# Patient Record
Sex: MALE | Race: WHITE | Employment: UNEMPLOYED | URBAN - METROPOLITAN AREA
[De-identification: names, ages, dates, MRNs, and addresses within clinical notes are randomized per-mention and may not be internally consistent; named-entity substitution may affect disease eponyms.]

---

## 2019-03-23 ENCOUNTER — APPOINTMENT (OUTPATIENT)
Dept: CT IMAGING | Age: 44
End: 2019-03-23
Attending: EMERGENCY MEDICINE
Payer: MEDICARE

## 2019-03-23 ENCOUNTER — HOSPITAL ENCOUNTER (EMERGENCY)
Age: 44
Discharge: HOME | End: 2019-03-24
Attending: EMERGENCY MEDICINE
Payer: MEDICARE

## 2019-03-23 DIAGNOSIS — M54.31 BILATERAL SCIATICA: Primary | ICD-10-CM

## 2019-03-23 DIAGNOSIS — M54.32 BILATERAL SCIATICA: Primary | ICD-10-CM

## 2019-03-23 LAB
ANION GAP SERPL CALC-SCNC: 6 MMOL/L (ref 3–18)
BASOPHILS # BLD: 0 K/UL (ref 0–0.1)
BASOPHILS NFR BLD: 1 % (ref 0–2)
BUN SERPL-MCNC: 12 MG/DL (ref 7–18)
BUN/CREAT SERPL: 11 (ref 12–20)
CALCIUM SERPL-MCNC: 8.5 MG/DL (ref 8.5–10.1)
CHLORIDE SERPL-SCNC: 103 MMOL/L (ref 100–108)
CO2 SERPL-SCNC: 30 MMOL/L (ref 21–32)
CREAT SERPL-MCNC: 1.06 MG/DL (ref 0.6–1.3)
DIFFERENTIAL METHOD BLD: ABNORMAL
EOSINOPHIL # BLD: 0.1 K/UL (ref 0–0.4)
EOSINOPHIL NFR BLD: 1 % (ref 0–5)
ERYTHROCYTE [DISTWIDTH] IN BLOOD BY AUTOMATED COUNT: 13.7 % (ref 11.6–14.5)
GLUCOSE SERPL-MCNC: 107 MG/DL (ref 74–99)
HCT VFR BLD AUTO: 43.2 % (ref 36–48)
HGB BLD-MCNC: 14.3 G/DL (ref 13–16)
LYMPHOCYTES # BLD: 3.3 K/UL (ref 0.9–3.6)
LYMPHOCYTES NFR BLD: 39 % (ref 21–52)
MCH RBC QN AUTO: 29.9 PG (ref 24–34)
MCHC RBC AUTO-ENTMCNC: 33.1 G/DL (ref 31–37)
MCV RBC AUTO: 90.4 FL (ref 74–97)
MONOCYTES # BLD: 0.9 K/UL (ref 0.05–1.2)
MONOCYTES NFR BLD: 11 % (ref 3–10)
NEUTS SEG # BLD: 4.1 K/UL (ref 1.8–8)
NEUTS SEG NFR BLD: 48 % (ref 40–73)
PLATELET # BLD AUTO: 323 K/UL (ref 135–420)
PMV BLD AUTO: 10.1 FL (ref 9.2–11.8)
POTASSIUM SERPL-SCNC: 4 MMOL/L (ref 3.5–5.5)
RBC # BLD AUTO: 4.78 M/UL (ref 4.7–5.5)
SODIUM SERPL-SCNC: 139 MMOL/L (ref 136–145)
WBC # BLD AUTO: 8.4 K/UL (ref 4.6–13.2)

## 2019-03-23 PROCEDURE — 94761 N-INVAS EAR/PLS OXIMETRY MLT: CPT

## 2019-03-23 PROCEDURE — 74011250636 HC RX REV CODE- 250/636: Performed by: EMERGENCY MEDICINE

## 2019-03-23 PROCEDURE — 72131 CT LUMBAR SPINE W/O DYE: CPT

## 2019-03-23 PROCEDURE — 77030005514 HC CATH URETH FOL14 BARD -A

## 2019-03-23 PROCEDURE — 93005 ELECTROCARDIOGRAM TRACING: CPT

## 2019-03-23 PROCEDURE — 51702 INSERT TEMP BLADDER CATH: CPT

## 2019-03-23 PROCEDURE — 85025 COMPLETE CBC W/AUTO DIFF WBC: CPT

## 2019-03-23 PROCEDURE — 96374 THER/PROPH/DIAG INJ IV PUSH: CPT

## 2019-03-23 PROCEDURE — 80048 BASIC METABOLIC PNL TOTAL CA: CPT

## 2019-03-23 PROCEDURE — 51798 US URINE CAPACITY MEASURE: CPT

## 2019-03-23 PROCEDURE — 99285 EMERGENCY DEPT VISIT HI MDM: CPT

## 2019-03-23 RX ORDER — DULOXETIN HYDROCHLORIDE 60 MG/1
60 CAPSULE, DELAYED RELEASE ORAL 2 TIMES DAILY
COMMUNITY

## 2019-03-23 RX ORDER — LIDOCAINE HYDROCHLORIDE AND EPINEPHRINE 10; 10 MG/ML; UG/ML
1.5 INJECTION, SOLUTION INFILTRATION; PERINEURAL ONCE
Status: DISCONTINUED | OUTPATIENT
Start: 2019-03-23 | End: 2019-03-23

## 2019-03-23 RX ORDER — ARIPIPRAZOLE 10 MG/1
10 TABLET ORAL
COMMUNITY

## 2019-03-23 RX ORDER — BUPRENORPHINE HYDROCHLORIDE 8 MG/1
TABLET SUBLINGUAL 2 TIMES DAILY
COMMUNITY

## 2019-03-23 RX ORDER — PREGABALIN 100 MG/1
CAPSULE ORAL 3 TIMES DAILY
COMMUNITY

## 2019-03-23 RX ORDER — MORPHINE SULFATE 4 MG/ML
4 INJECTION INTRAVENOUS
Status: COMPLETED | OUTPATIENT
Start: 2019-03-23 | End: 2019-03-23

## 2019-03-23 RX ADMIN — MORPHINE SULFATE 4 MG: 4 INJECTION INTRAVENOUS at 23:15

## 2019-03-24 VITALS
TEMPERATURE: 98.2 F | WEIGHT: 190 LBS | OXYGEN SATURATION: 97 % | RESPIRATION RATE: 13 BRPM | BODY MASS INDEX: 28.79 KG/M2 | HEIGHT: 68 IN | DIASTOLIC BLOOD PRESSURE: 65 MMHG | SYSTOLIC BLOOD PRESSURE: 126 MMHG | HEART RATE: 93 BPM

## 2019-03-24 LAB
ATRIAL RATE: 146 BPM
CALCULATED P AXIS, ECG09: 52 DEGREES
CALCULATED R AXIS, ECG10: 32 DEGREES
CALCULATED T AXIS, ECG11: 108 DEGREES
DIAGNOSIS, 93000: NORMAL
P-R INTERVAL, ECG05: 118 MS
Q-T INTERVAL, ECG07: 292 MS
QRS DURATION, ECG06: 84 MS
QTC CALCULATION (BEZET), ECG08: 455 MS
VENTRICULAR RATE, ECG03: 146 BPM

## 2019-03-24 PROCEDURE — 96361 HYDRATE IV INFUSION ADD-ON: CPT

## 2019-03-24 PROCEDURE — 74011250636 HC RX REV CODE- 250/636: Performed by: EMERGENCY MEDICINE

## 2019-03-24 RX ADMIN — SODIUM CHLORIDE 1000 ML: 900 INJECTION, SOLUTION INTRAVENOUS at 01:40

## 2019-03-24 NOTE — ED NOTES
Pt is awake/alert/oriented; affect calm/conversant, respirations regular/non labored, skin warm and dry. Maximum amount of urine noted on scan 117 ml; Dr Gretchen Wasserman notified of pt complaint, amount of urine on bladder scan, and elevated HR. No further orders at this time.

## 2019-03-24 NOTE — DISCHARGE INSTRUCTIONS
Patient Education        Back Pain: Care Instructions  Your Care Instructions    Back pain has many possible causes. It is often related to problems with muscles and ligaments of the back. It may also be related to problems with the nerves, discs, or bones of the back. Moving, lifting, standing, sitting, or sleeping in an awkward way can strain the back. Sometimes you don't notice the injury until later. Arthritis is another common cause of back pain. Although it may hurt a lot, back pain usually improves on its own within several weeks. Most people recover in 12 weeks or less. Using good home treatment and being careful not to stress your back can help you feel better sooner. Follow-up care is a key part of your treatment and safety. Be sure to make and go to all appointments, and call your doctor if you are having problems. It's also a good idea to know your test results and keep a list of the medicines you take. How can you care for yourself at home? · Sit or lie in positions that are most comfortable and reduce your pain. Try one of these positions when you lie down:  ? Lie on your back with your knees bent and supported by large pillows. ? Lie on the floor with your legs on the seat of a sofa or chair. ? Lie on your side with your knees and hips bent and a pillow between your legs. ? Lie on your stomach if it does not make pain worse. · Do not sit up in bed, and avoid soft couches and twisted positions. Bed rest can help relieve pain at first, but it delays healing. Avoid bed rest after the first day of back pain. · Change positions every 30 minutes. If you must sit for long periods of time, take breaks from sitting. Get up and walk around, or lie in a comfortable position. · Try using a heating pad on a low or medium setting for 15 to 20 minutes every 2 or 3 hours. Try a warm shower in place of one session with the heating pad. · You can also try an ice pack for 10 to 15 minutes every 2 to 3 hours. Put a thin cloth between the ice pack and your skin. · Take pain medicines exactly as directed. ? If the doctor gave you a prescription medicine for pain, take it as prescribed. ? If you are not taking a prescription pain medicine, ask your doctor if you can take an over-the-counter medicine. · Take short walks several times a day. You can start with 5 to 10 minutes, 3 or 4 times a day, and work up to longer walks. Walk on level surfaces and avoid hills and stairs until your back is better. · Return to work and other activities as soon as you can. Continued rest without activity is usually not good for your back. · To prevent future back pain, do exercises to stretch and strengthen your back and stomach. Learn how to use good posture, safe lifting techniques, and proper body mechanics. When should you call for help? Call your doctor now or seek immediate medical care if:    · You have new or worsening numbness in your legs.     · You have new or worsening weakness in your legs. (This could make it hard to stand up.)     · You lose control of your bladder or bowels.    Watch closely for changes in your health, and be sure to contact your doctor if:    · You have a fever, lose weight, or don't feel well.     · You do not get better as expected. Where can you learn more? Go to http://nacho-gideon.info/. Enter G578 in the search box to learn more about \"Back Pain: Care Instructions. \"  Current as of: September 20, 2018  Content Version: 11.9  © 0333-0780 [employer]. Care instructions adapted under license by [employer] (which disclaims liability or warranty for this information). If you have questions about a medical condition or this instruction, always ask your healthcare professional. [place] [address] any warranty or liability for your use of this information.

## 2019-03-24 NOTE — ED TRIAGE NOTES
Patient reports to ED with complaints right sided low back pain and urinary retention. Patient reports  Having to straight cath due to severe pain in his back. Patient reports previous spinal surgeries.

## 2019-03-24 NOTE — ED NOTES
Pt sitting up in bed without difficulty/distress; son present at bedside. Affect calm/conversant; noted to be able to move lower extremities without difficulty. No distress noted.

## 2019-03-24 NOTE — ED PROVIDER NOTES
HPI patient is a 52-year-old male with a history of chronic back pain and intermittent self catheterizatio from [address] [place] [address]. He recently move to this area. He was doing heavy lefting this evening and developed severe back pain and not being able to urinate. He states he has a hx of urinary retention sometime when has a flare up of his lower back pain. That is why he sometimes self calf himself. Past Medical History:  
Diagnosis Date  Neurological disorder closed head injury  Psychiatric disorder ptsd  PUD (peptic ulcer disease) ulcers Past Surgical History:  
Procedure Laterality Date  HX ORTHOPAEDIC  2 back History reviewed. No pertinent family history. Social History Socioeconomic History  Marital status: SINGLE Spouse name: Not on file  Number of children: Not on file  Years of education: Not on file  Highest education level: Not on file Occupational History  Not on file Social Needs  Financial resource strain: Not on file  Food insecurity:  
  Worry: Not on file Inability: Not on file  Transportation needs:  
  Medical: Not on file Non-medical: Not on file Tobacco Use  Smoking status: Current Every Day Smoker Substance and Sexual Activity  Alcohol use: No  
 Drug use: No  
 Sexual activity: Not on file Lifestyle  Physical activity:  
  Days per week: Not on file Minutes per session: Not on file  Stress: Not on file Relationships  Social connections:  
  Talks on phone: Not on file Gets together: Not on file Attends Religion service: Not on file Active member of club or organization: Not on file Attends meetings of clubs or organizations: Not on file Relationship status: Not on file  Intimate partner violence:  
  Fear of current or ex partner: Not on file Emotionally abused: Not on file Physically abused: Not on file Forced sexual activity: Not on file Other Topics Concern  Not on file Social History Narrative  Not on file ALLERGIES: Clindamycin; Darvocet a500 [propoxyphene n-acetaminophen]; Flexeril [cyclobenzaprine]; Nubain [nalbuphine]; and Septra [sulfamethoxazole-trimethoprim] Review of Systems Constitutional: Negative. HENT: Negative. Eyes: Negative. Respiratory: Negative. Cardiovascular: Negative. Gastrointestinal: Negative. Endocrine: Negative. Genitourinary: Negative. Musculoskeletal: Positive for back pain. Skin: Negative. Allergic/Immunologic: Negative. Neurological: Negative. Hematological: Negative. Psychiatric/Behavioral: Negative. All other systems reviewed and are negative. Vitals:  
 03/23/19 2157 03/23/19 2300 03/23/19 2336 03/23/19 2340 BP: (!) 144/91 Pulse: (!) 160 (!) 114 (!) 110 (!) 109 Resp: 18 (!) 5 12 13 Temp: 98.9 °F (37.2 °C) SpO2: 95% Weight: 86.2 kg (190 lb) Height: 5' 8\" (1.727 m) Physical Exam  
Constitutional: He is oriented to person, place, and time. He appears well-developed and well-nourished. No distress. HENT:  
Head: Normocephalic. Mouth/Throat: Oropharynx is clear and moist.  
Eyes: Pupils are equal, round, and reactive to light. Conjunctivae and EOM are normal.  
Neck: Normal range of motion. Neck supple. Cardiovascular: Normal rate, regular rhythm, normal heart sounds and intact distal pulses. No murmur heard. Pulmonary/Chest: Effort normal and breath sounds normal. No respiratory distress. He has no wheezes. He has no rales. He exhibits no tenderness. Abdominal: Soft. Bowel sounds are normal. He exhibits no distension. There is no tenderness. There is no rebound. Musculoskeletal: Normal range of motion. He exhibits no edema or tenderness. BACK: (+) right leg pain SLR, normal ROM, pulses and sensory. Neurological: He is alert and oriented to person, place, and time.  No cranial nerve deficit. He exhibits normal muscle tone. Coordination normal.  
Skin: Skin is warm and dry. No rash noted. Psychiatric: He has a normal mood and affect. His behavior is normal. Judgment and thought content normal.  
Nursing note and vitals reviewed. MDM: Differential diagnosis: Cauda equina, herniated disc, sciatica, spinal stenosis, opiate addiction, Dx: Sciatica, chronic urinary retention Disp:  D/C home

## 2019-03-24 NOTE — ED NOTES
Pt remains slightly tachycardic; Dr Gretchen Wasserman notified and one liter 0.9 NS ordered. Pt remains awake/alert/conversant without distress; liter infusing at this time.

## 2019-03-24 NOTE — ED NOTES
Pt sitting up in bed calmly/texting on cell phone with son present at bedside. Respirations regular/non labored, clear yellow urine draining in bed, with lowered HR in one-teens (last check 113). No distress noted.

## 2019-03-24 NOTE — ED NOTES
Ortiz bag placed per sterile technique. Penis was pre-cleaned with 3 separate Chloraprep swabs, then sterile procedure used with enclosed Ortiz kit and supplied betadine swabs. Hazards of Ortiz placement discussed including UTI; instructed to return immediately for worsening pain/fever/shortness of breath/other concerns. Pt voiced his understanding. 250 ml clear yellow urine noted.

## 2019-03-24 NOTE — ED NOTES
Pt discharged to home; pt able to move lower extremities without difficulty, was ambulatory without difficulty. Per pt request and MD order, Ortiz removed without difficulty or pain. Pt reports he self caths with episodes of urinary retention and that he came in primarily for back pain. Pt instructed to return for worsening back/leg pain, leg numbness/difficulty weight bearing, urinary retention, or other concerns. Instructed to drink clear fluids and ensure clear urine output. Pt reports he self cathed prior to arrival; pt instructed to perform good hygiene prior to self catheterization and informed severe UTI could result from poor hygienic practices. Instructed to follow up with neurology referral and his pain management specialist.  Joann Maloney to avoid buprenorphine when feeling sedated and not to take prior to 0800; per Dr Radha Bryant pt can take his 8 am dose as scheduled. Pt awake/alert/oriented; sitting up in bed talking and texting on his cell phone without difficulty. Airway remains patent, respirations regular/non labored/skin warm and dry. Pt RR 11-20; oxygen saturation 97 to 100 percent. IV has infused without difficulty; no ectopy on the monitor and HR is consistently NSR. Dr Radha Bryant has cleared pt for discharge.

## 2019-04-12 ENCOUNTER — HOSPITAL ENCOUNTER (EMERGENCY)
Age: 44
Discharge: HOME | End: 2019-04-12
Attending: EMERGENCY MEDICINE | Admitting: EMERGENCY MEDICINE
Payer: MEDICARE

## 2019-04-12 ENCOUNTER — APPOINTMENT (OUTPATIENT)
Dept: GENERAL RADIOLOGY | Age: 44
End: 2019-04-12
Attending: PHYSICIAN ASSISTANT
Payer: MEDICARE

## 2019-04-12 VITALS
WEIGHT: 195 LBS | RESPIRATION RATE: 18 BRPM | BODY MASS INDEX: 29.55 KG/M2 | SYSTOLIC BLOOD PRESSURE: 144 MMHG | OXYGEN SATURATION: 98 % | DIASTOLIC BLOOD PRESSURE: 87 MMHG | HEIGHT: 68 IN | HEART RATE: 100 BPM | TEMPERATURE: 97.6 F

## 2019-04-12 DIAGNOSIS — R06.02 SOB (SHORTNESS OF BREATH): Primary | ICD-10-CM

## 2019-04-12 DIAGNOSIS — Z76.0 MEDICATION REFILL: ICD-10-CM

## 2019-04-12 DIAGNOSIS — M79.89 RIGHT LEG SWELLING: ICD-10-CM

## 2019-04-12 LAB
ALBUMIN SERPL-MCNC: 3.4 G/DL (ref 3.4–5)
ALBUMIN/GLOB SERPL: 1 {RATIO} (ref 0.8–1.7)
ALP SERPL-CCNC: 91 U/L (ref 45–117)
ALT SERPL-CCNC: 33 U/L (ref 16–61)
ANION GAP SERPL CALC-SCNC: 8 MMOL/L (ref 3–18)
APPEARANCE UR: CLEAR
AST SERPL-CCNC: 24 U/L (ref 15–37)
ATRIAL RATE: 98 BPM
BASOPHILS # BLD: 0 K/UL (ref 0–0.1)
BASOPHILS NFR BLD: 1 % (ref 0–2)
BILIRUB SERPL-MCNC: 0.2 MG/DL (ref 0.2–1)
BILIRUB UR QL: NEGATIVE
BNP SERPL-MCNC: 61 PG/ML (ref 0–450)
BUN SERPL-MCNC: 11 MG/DL (ref 7–18)
BUN/CREAT SERPL: 11 (ref 12–20)
CALCIUM SERPL-MCNC: 8.3 MG/DL (ref 8.5–10.1)
CALCULATED P AXIS, ECG09: 37 DEGREES
CALCULATED R AXIS, ECG10: 7 DEGREES
CALCULATED T AXIS, ECG11: 23 DEGREES
CHLORIDE SERPL-SCNC: 103 MMOL/L (ref 100–108)
CO2 SERPL-SCNC: 32 MMOL/L (ref 21–32)
COLOR UR: YELLOW
CREAT SERPL-MCNC: 1.04 MG/DL (ref 0.6–1.3)
D DIMER PPP FEU-MCNC: 0.29 UG/ML(FEU)
DIAGNOSIS, 93000: NORMAL
DIFFERENTIAL METHOD BLD: ABNORMAL
EOSINOPHIL # BLD: 0.1 K/UL (ref 0–0.4)
EOSINOPHIL NFR BLD: 3 % (ref 0–5)
ERYTHROCYTE [DISTWIDTH] IN BLOOD BY AUTOMATED COUNT: 13.8 % (ref 11.6–14.5)
GLOBULIN SER CALC-MCNC: 3.5 G/DL (ref 2–4)
GLUCOSE SERPL-MCNC: 122 MG/DL (ref 74–99)
GLUCOSE UR STRIP.AUTO-MCNC: NEGATIVE MG/DL
HCT VFR BLD AUTO: 40.8 % (ref 36–48)
HGB BLD-MCNC: 13.2 G/DL (ref 13–16)
HGB UR QL STRIP: NEGATIVE
KETONES UR QL STRIP.AUTO: NEGATIVE MG/DL
LEUKOCYTE ESTERASE UR QL STRIP.AUTO: NEGATIVE
LYMPHOCYTES # BLD: 2 K/UL (ref 0.9–3.6)
LYMPHOCYTES NFR BLD: 45 % (ref 21–52)
MCH RBC QN AUTO: 29.9 PG (ref 24–34)
MCHC RBC AUTO-ENTMCNC: 32.4 G/DL (ref 31–37)
MCV RBC AUTO: 92.5 FL (ref 74–97)
MONOCYTES # BLD: 0.4 K/UL (ref 0.05–1.2)
MONOCYTES NFR BLD: 10 % (ref 3–10)
NEUTS SEG # BLD: 1.8 K/UL (ref 1.8–8)
NEUTS SEG NFR BLD: 41 % (ref 40–73)
NITRITE UR QL STRIP.AUTO: NEGATIVE
P-R INTERVAL, ECG05: 130 MS
PH UR STRIP: 6 [PH] (ref 5–8)
PLATELET # BLD AUTO: 194 K/UL (ref 135–420)
PMV BLD AUTO: 10.7 FL (ref 9.2–11.8)
POTASSIUM SERPL-SCNC: 3.7 MMOL/L (ref 3.5–5.5)
PROT SERPL-MCNC: 6.9 G/DL (ref 6.4–8.2)
PROT UR STRIP-MCNC: NEGATIVE MG/DL
Q-T INTERVAL, ECG07: 364 MS
QRS DURATION, ECG06: 102 MS
QTC CALCULATION (BEZET), ECG08: 464 MS
RBC # BLD AUTO: 4.41 M/UL (ref 4.7–5.5)
SODIUM SERPL-SCNC: 143 MMOL/L (ref 136–145)
SP GR UR REFRACTOMETRY: 1.01 (ref 1–1.03)
UROBILINOGEN UR QL STRIP.AUTO: 0.2 EU/DL (ref 0.2–1)
VENTRICULAR RATE, ECG03: 98 BPM
WBC # BLD AUTO: 4.4 K/UL (ref 4.6–13.2)

## 2019-04-12 PROCEDURE — 71045 X-RAY EXAM CHEST 1 VIEW: CPT

## 2019-04-12 PROCEDURE — 85025 COMPLETE CBC W/AUTO DIFF WBC: CPT

## 2019-04-12 PROCEDURE — 81003 URINALYSIS AUTO W/O SCOPE: CPT

## 2019-04-12 PROCEDURE — 77030029684 HC NEB SM VOL KT MONA -A

## 2019-04-12 PROCEDURE — 93005 ELECTROCARDIOGRAM TRACING: CPT

## 2019-04-12 PROCEDURE — 99283 EMERGENCY DEPT VISIT LOW MDM: CPT

## 2019-04-12 PROCEDURE — 85379 FIBRIN DEGRADATION QUANT: CPT

## 2019-04-12 PROCEDURE — 80053 COMPREHEN METABOLIC PANEL: CPT

## 2019-04-12 PROCEDURE — 94640 AIRWAY INHALATION TREATMENT: CPT

## 2019-04-12 PROCEDURE — 74011000250 HC RX REV CODE- 250: Performed by: PHYSICIAN ASSISTANT

## 2019-04-12 PROCEDURE — 83880 ASSAY OF NATRIURETIC PEPTIDE: CPT

## 2019-04-12 RX ORDER — DICLOFENAC SODIUM 10 MG/G
GEL TOPICAL 4 TIMES DAILY
COMMUNITY

## 2019-04-12 RX ORDER — IPRATROPIUM BROMIDE AND ALBUTEROL SULFATE 2.5; .5 MG/3ML; MG/3ML
3 SOLUTION RESPIRATORY (INHALATION)
Status: COMPLETED | OUTPATIENT
Start: 2019-04-12 | End: 2019-04-12

## 2019-04-12 RX ORDER — FUROSEMIDE 40 MG/1
40 TABLET ORAL DAILY
COMMUNITY

## 2019-04-12 RX ORDER — FUROSEMIDE 20 MG/1
40 TABLET ORAL DAILY
Qty: 15 TAB | Refills: 0 | Status: SHIPPED | OUTPATIENT
Start: 2019-04-12

## 2019-04-12 RX ADMIN — IPRATROPIUM BROMIDE AND ALBUTEROL SULFATE 3 ML: .5; 3 SOLUTION RESPIRATORY (INHALATION) at 11:29

## 2019-04-12 NOTE — DISCHARGE INSTRUCTIONS

## 2019-04-12 NOTE — ED TRIAGE NOTES
Patient reports swelling noted on the right tlower extremity for couple days and swelling with tightness around abdominal area for couple weeks Patient complaints of shortness of breaath

## 2019-04-12 NOTE — ED PROVIDER NOTES
EMERGENCY DEPARTMENT HISTORY AND PHYSICAL EXAM 
 
Date: 4/12/2019 Patient Name: Delmi Spain History of Presenting Illness Chief Complaint Patient presents with  Shortness of Breath History Provided By: Patient Chief Complaint: Right lower leg swelling, right ankle swelling, and abdominal tightness, intermittent shortness of breath Duration: Days Timing: Gradual 
Location: Right lower leg and abdomen Quality: Tight Severity: 6 out of 10 Modifying Factors: Taking 40 mg furosemide for 3 days did not help. Associated Symptoms: none Additional History (Context): Delmi Spain is a 44 y.o. male with a history of PTSD, spinal injury, fatty liver disease who presents today for a few day history of left lower extremity swelling abdominal tightness and shortness of breath. Patient states he feels pollen may be causing this. Patient reports history of this in the past, but denies any known history of Y. Patient states his doctor put him on furosemide as needed 1-2 tablets. Patient denies any history of PE or DVT. Patient denies history of kidney disease or cardiac disease. Patient states he does intermittently self cath due to spinal injuries in the past.  Patient denies any recent long periods of travel, recent surgeries, hormone use. Denies any injuries or falls. Denies any fevers, chills, nausea or vomiting. Denies any difficulty with bowel movements. Patient reports he recently moved here and his insurance has not kicked in, so he has not followed up with her primary care doctor. Her extra pillows at night. PCP: Unknown, Provider Current Outpatient Medications Medication Sig Dispense Refill  ibuprofen 100 mg tablet Take 100 mg by mouth every six (6) hours as needed for Pain.  furosemide (LASIX) 40 mg tablet Take 40 mg by mouth daily.  diclofenac (VOLTAREN) 1 % gel Apply  to affected area four (4) times daily.  furosemide (LASIX) 20 mg tablet Take 2 Tabs by mouth daily. 15 Tab 0  
 DULoxetine (CYMBALTA) 60 mg capsule Take 60 mg by mouth two (2) times a day.  pregabalin (LYRICA) 100 mg capsule Take  by mouth three (3) times daily.  ARIPiprazole (ABILIFY) 10 mg tablet Take 10 mg by mouth nightly.  buprenorphine HCl (SUBUTEX) 8 mg sublingual tablet by SubLINGual route two (2) times a day. Past History Past Medical History: 
Past Medical History:  
Diagnosis Date  Neurological disorder closed head injury Seizure, stroke  Psychiatric disorder ptsd  PUD (peptic ulcer disease) ulcers Past Surgical History: 
Past Surgical History:  
Procedure Laterality Date  HX ORTHOPAEDIC  2 back Family History: 
History reviewed. No pertinent family history. Social History: 
Social History Tobacco Use  Smoking status: Current Every Day Smoker  Smokeless tobacco: Never Used Substance Use Topics  Alcohol use: Yes Comment: seldom  Drug use: No  
 
 
Allergies: Allergies Allergen Reactions  Clindamycin Nausea and Vomiting  Darvocet A500 [Propoxyphene N-Acetaminophen] Rash  Flexeril [Cyclobenzaprine] Other (comments)  Nubain [Nalbuphine] Other (comments)  Septra [Sulfamethoxazole-Trimethoprim] Nausea and Vomiting Review of Systems Review of Systems Constitutional: Negative for chills and fever. HENT: Negative for congestion, ear pain, postnasal drip, rhinorrhea, sore throat, tinnitus and trouble swallowing. Eyes: Negative for pain. Respiratory: Positive for shortness of breath. Negative for cough and wheezing. Cardiovascular: Positive for leg swelling. Negative for chest pain and palpitations. Gastrointestinal: Positive for abdominal distention. Negative for abdominal pain, blood in stool, constipation, diarrhea, nausea and vomiting. Genitourinary: Negative for dysuria, frequency and hematuria. Musculoskeletal: Negative for back pain, myalgias, neck pain and neck stiffness. Skin: Negative for rash and wound. Neurological: Negative for dizziness, seizures, speech difficulty, weakness and headaches. All other systems reviewed and are negative. All Other Systems Negative Physical Exam  
 
Vitals:  
 04/12/19 1053 BP: 144/87 Pulse: 100 Resp: 18 Temp: 97.6 °F (36.4 °C) SpO2: 98% Weight: 88.5 kg (195 lb) Height: 5' 8\" (1.727 m) Physical Exam  
Constitutional: He is oriented to person, place, and time. He appears well-developed and well-nourished. No distress. HENT:  
Head: Normocephalic and atraumatic. Mouth/Throat: Uvula is midline. No trismus in the jaw. No uvula swelling. Eyes: Conjunctivae are normal.  
Neck: Normal range of motion and full passive range of motion without pain. Neck supple. Normal range of motion present. Cardiovascular: Normal rate, regular rhythm and normal heart sounds. Pulmonary/Chest: Effort normal. No respiratory distress. He has wheezes. He has no rhonchi. He has no rales. He exhibits no tenderness. Bilateral diffuse expiratory wheezing Abdominal: Soft. Bowel sounds are normal. He exhibits distension. There is no tenderness. There is no rebound and no guarding. To moderate distention, otherwise nontender, bowel sounds within normal limits Musculoskeletal: Normal range of motion. He exhibits no edema or deformity. Right upper leg: He exhibits swelling. Legs: 
Neurological: He is alert and oriented to person, place, and time. Skin: Skin is warm and dry. He is not diaphoretic. Psychiatric: He has a normal mood and affect. Nursing note and vitals reviewed. Diagnostic Study Results Labs - Recent Results (from the past 12 hour(s)) EKG, 12 LEAD, INITIAL Collection Time: 04/12/19 11:09 AM  
Result Value Ref Range Ventricular Rate 98 BPM  
 Atrial Rate 98 BPM  
 P-R Interval 130 ms QRS Duration 102 ms Q-T Interval 364 ms QTC Calculation (Bezet) 464 ms Calculated P Axis 37 degrees Calculated R Axis 7 degrees Calculated T Axis 23 degrees Diagnosis Normal sinus rhythm Minimal voltage criteria for LVH, may be normal variant Nonspecific T wave abnormality Prolonged QT Abnormal ECG When compared with ECG of 23-MAR-2019 22:42, 
Vent. rate has decreased BY  48 BPM 
ST no longer depressed in Anterior leads CBC WITH AUTOMATED DIFF Collection Time: 04/12/19 11:15 AM  
Result Value Ref Range WBC 4.4 (L) 4.6 - 13.2 K/uL  
 RBC 4.41 (L) 4.70 - 5.50 M/uL  
 HGB 13.2 13.0 - 16.0 g/dL HCT 40.8 36.0 - 48.0 % MCV 92.5 74.0 - 97.0 FL  
 MCH 29.9 24.0 - 34.0 PG  
 MCHC 32.4 31.0 - 37.0 g/dL  
 RDW 13.8 11.6 - 14.5 % PLATELET 108 078 - 412 K/uL MPV 10.7 9.2 - 11.8 FL  
 NEUTROPHILS 41 40 - 73 % LYMPHOCYTES 45 21 - 52 % MONOCYTES 10 3 - 10 % EOSINOPHILS 3 0 - 5 % BASOPHILS 1 0 - 2 %  
 ABS. NEUTROPHILS 1.8 1.8 - 8.0 K/UL  
 ABS. LYMPHOCYTES 2.0 0.9 - 3.6 K/UL  
 ABS. MONOCYTES 0.4 0.05 - 1.2 K/UL  
 ABS. EOSINOPHILS 0.1 0.0 - 0.4 K/UL  
 ABS. BASOPHILS 0.0 0.0 - 0.1 K/UL  
 DF AUTOMATED METABOLIC PANEL, COMPREHENSIVE Collection Time: 04/12/19 11:15 AM  
Result Value Ref Range Sodium 143 136 - 145 mmol/L Potassium 3.7 3.5 - 5.5 mmol/L Chloride 103 100 - 108 mmol/L  
 CO2 32 21 - 32 mmol/L Anion gap 8 3.0 - 18 mmol/L Glucose 122 (H) 74 - 99 mg/dL BUN 11 7.0 - 18 MG/DL Creatinine 1.04 0.6 - 1.3 MG/DL  
 BUN/Creatinine ratio 11 (L) 12 - 20 GFR est AA >60 >60 ml/min/1.73m2 GFR est non-AA >60 >60 ml/min/1.73m2 Calcium 8.3 (L) 8.5 - 10.1 MG/DL Bilirubin, total 0.2 0.2 - 1.0 MG/DL  
 ALT (SGPT) 33 16 - 61 U/L  
 AST (SGOT) 24 15 - 37 U/L Alk. phosphatase 91 45 - 117 U/L Protein, total 6.9 6.4 - 8.2 g/dL Albumin 3.4 3.4 - 5.0 g/dL Globulin 3.5 2.0 - 4.0 g/dL A-G Ratio 1.0 0.8 - 1.7 D DIMER  
 Collection Time: 04/12/19 11:15 AM  
Result Value Ref Range D DIMER 0.29 <0.46 ug/ml(FEU) NT-PRO BNP Collection Time: 04/12/19 11:15 AM  
Result Value Ref Range NT pro-BNP 61 0 - 450 PG/ML  
URINALYSIS W/ RFLX MICROSCOPIC Collection Time: 04/12/19 12:15 PM  
Result Value Ref Range Color YELLOW Appearance CLEAR Specific gravity 1.009 1.005 - 1.030    
 pH (UA) 6.0 5.0 - 8.0 Protein NEGATIVE  NEG mg/dL Glucose NEGATIVE  NEG mg/dL Ketone NEGATIVE  NEG mg/dL Bilirubin NEGATIVE  NEG Blood NEGATIVE  NEG Urobilinogen 0.2 0.2 - 1.0 EU/dL Nitrites NEGATIVE  NEG Leukocyte Esterase NEGATIVE  NEG Radiologic Studies -  
XR CHEST PORT Final Result IMPRESSION:  
  
No acute chest process. CT Results  (Last 48 hours) None CXR Results  (Last 48 hours) 04/12/19 1125  XR CHEST PORT Final result Impression:  IMPRESSION:  
   
No acute chest process. Narrative:  EXAM:  XR CHEST PORT INDICATION:   shortness of breath COMPARISON: None FINDINGS:   
   
Underinflated lungs with no parenchymal consolidation. No pneumothorax or  
pleural effusion. Normal heart size. Prior screw fixation of the lower cervical  
and upper thoracic spine. Medical Decision Making I am the first provider for this patient. I reviewed the vital signs, available nursing notes, past medical history, past surgical history, family history and social history. Vital Signs-Reviewed the patient's vital signs. Pulse Oximetry Analysis -  100 % RA Records Reviewed: Nursing Notes and Old Medical Records Procedures: None Procedures Provider Notes (Medical Decision Making):  
 
Differential: UTI, kidney disease, CHF, asthma exacerbation, DVT, cirrhosis, pneumonia Plan: Will order duo neb, d-dimer, labs, chest xray, ekg 11:24 AM 
Wells score of 1, will order d-dimer if positive will order ultrasound if negative we will not order ultrasound. 2:08 PM 
D-dimer within normal limits. Patient states he is feeling better at this time. Wheezing has resolved. Have shared reassuring workup with patient. Have advised patient to rest and elevate right leg as well as to use compression stockings. Will refill short supply of furosemide, have stressed the importance of establishing primary care here. Have discussed the need to call  to assist with his insurance. Will discharge home with phone number. Have also advised patient to follow-up with may be free clinic. Patient agrees with the plan and management and states all questions have been thoroughly answered and there are no more remaining questions. MED RECONCILIATION: 
No current facility-administered medications for this encounter. Current Outpatient Medications Medication Sig  ibuprofen 100 mg tablet Take 100 mg by mouth every six (6) hours as needed for Pain.  furosemide (LASIX) 40 mg tablet Take 40 mg by mouth daily.  diclofenac (VOLTAREN) 1 % gel Apply  to affected area four (4) times daily.  furosemide (LASIX) 20 mg tablet Take 2 Tabs by mouth daily.  DULoxetine (CYMBALTA) 60 mg capsule Take 60 mg by mouth two (2) times a day.  pregabalin (LYRICA) 100 mg capsule Take  by mouth three (3) times daily.  ARIPiprazole (ABILIFY) 10 mg tablet Take 10 mg by mouth nightly.  buprenorphine HCl (SUBUTEX) 8 mg sublingual tablet by SubLINGual route two (2) times a day. Disposition: 
Home DISCHARGE NOTE:  
Pt has been reexamined. Patient has no new complaints, changes, or physical findings. Care plan outlined and precautions discussed. Results of workup were reviewed with the patient. All medications were reviewed with the patient. All of pt's questions and concerns were addressed.  Patient was instructed and agrees to follow up with  and primary care as well as to return to the ED upon further deterioration. Patient is ready to go home. Follow-up Information Follow up With Specialties Details Why Contact Info [id number] [facility] Emergency Medicine  As needed [address] [id number] [phone] [place] [facility] [address] [address] 
[phone] [place] [address]  Call  [facility]. [facility] [address] [facility] [address] 
[phone] Current Discharge Medication List  
  
START taking these medications Details  
!! furosemide (LASIX) 20 mg tablet Take 2 Tabs by mouth daily. Qty: 15 Tab, Refills: 0  
  
 !! - Potential duplicate medications found. Please discuss with provider. CONTINUE these medications which have NOT CHANGED Details  
!! furosemide (LASIX) 40 mg tablet Take 40 mg by mouth daily. !! - Potential duplicate medications found. Please discuss with provider. Diagnosis Clinical Impression:  
1. SOB (shortness of breath) 2. Right leg swelling 3. Medication refill